# Patient Record
Sex: FEMALE | Race: WHITE | NOT HISPANIC OR LATINO | Employment: OTHER | URBAN - METROPOLITAN AREA
[De-identification: names, ages, dates, MRNs, and addresses within clinical notes are randomized per-mention and may not be internally consistent; named-entity substitution may affect disease eponyms.]

---

## 2017-01-19 ENCOUNTER — OFFICE VISIT - HEALTHEAST (OUTPATIENT)
Dept: FAMILY MEDICINE | Facility: CLINIC | Age: 37
End: 2017-01-19

## 2017-01-19 DIAGNOSIS — N92.6 MENSTRUAL IRREGULARITY: ICD-10-CM

## 2017-01-19 DIAGNOSIS — M21.962 KNEE DEFORMITY, ACQUIRED, LEFT: ICD-10-CM

## 2017-01-19 DIAGNOSIS — R06.83 SNORING: ICD-10-CM

## 2017-01-19 RX ORDER — IBUPROFEN 600 MG/1
600 TABLET, FILM COATED ORAL
Status: SHIPPED | COMMUNITY
Start: 2010-12-08

## 2017-02-16 ENCOUNTER — OFFICE VISIT - HEALTHEAST (OUTPATIENT)
Dept: OTOLARYNGOLOGY | Facility: CLINIC | Age: 37
End: 2017-02-16

## 2017-02-16 DIAGNOSIS — R06.83 SNORING: ICD-10-CM

## 2017-02-16 ASSESSMENT — MIFFLIN-ST. JEOR: SCORE: 1510.72

## 2018-06-15 ENCOUNTER — RECORDS - HEALTHEAST (OUTPATIENT)
Dept: ADMINISTRATIVE | Facility: OTHER | Age: 38
End: 2018-06-15

## 2018-06-21 ENCOUNTER — AMBULATORY - HEALTHEAST (OUTPATIENT)
Dept: FAMILY MEDICINE | Facility: CLINIC | Age: 38
End: 2018-06-21

## 2018-06-21 DIAGNOSIS — Z78.9 INFANT EXCLUSIVELY BREASTFED: ICD-10-CM

## 2018-06-25 ENCOUNTER — COMMUNICATION - HEALTHEAST (OUTPATIENT)
Dept: OBGYN | Facility: CLINIC | Age: 38
End: 2018-06-25

## 2018-07-12 ENCOUNTER — RECORDS - HEALTHEAST (OUTPATIENT)
Dept: ADMINISTRATIVE | Facility: OTHER | Age: 38
End: 2018-07-12

## 2018-07-20 ENCOUNTER — COMMUNICATION - HEALTHEAST (OUTPATIENT)
Dept: OBGYN | Facility: CLINIC | Age: 38
End: 2018-07-20

## 2018-10-18 ENCOUNTER — AMBULATORY - HEALTHEAST (OUTPATIENT)
Dept: NURSING | Facility: CLINIC | Age: 38
End: 2018-10-18

## 2018-10-19 ENCOUNTER — COMMUNICATION - HEALTHEAST (OUTPATIENT)
Dept: TELEHEALTH | Facility: CLINIC | Age: 38
End: 2018-10-19

## 2018-10-19 ENCOUNTER — COMMUNICATION - HEALTHEAST (OUTPATIENT)
Dept: HEALTH INFORMATION MANAGEMENT | Facility: CLINIC | Age: 38
End: 2018-10-19

## 2018-12-17 ENCOUNTER — OFFICE VISIT - HEALTHEAST (OUTPATIENT)
Dept: OTOLARYNGOLOGY | Facility: CLINIC | Age: 38
End: 2018-12-17

## 2018-12-17 DIAGNOSIS — R06.83 SNORING: ICD-10-CM

## 2018-12-17 RX ORDER — BREAST PUMP
EACH MISCELLANEOUS
Status: SHIPPED | COMMUNITY
Start: 2018-06-17

## 2018-12-17 RX ORDER — FERROUS SULFATE 325(65) MG
325 TABLET ORAL
Status: SHIPPED | COMMUNITY
Start: 2018-04-05

## 2019-01-17 ENCOUNTER — OFFICE VISIT - HEALTHEAST (OUTPATIENT)
Dept: SLEEP MEDICINE | Facility: CLINIC | Age: 39
End: 2019-01-17

## 2019-01-17 DIAGNOSIS — R06.83 SNORING: ICD-10-CM

## 2019-01-17 DIAGNOSIS — G47.10 EXCESSIVE SLEEPINESS: ICD-10-CM

## 2019-01-17 ASSESSMENT — MIFFLIN-ST. JEOR: SCORE: 1561.53

## 2019-01-29 ENCOUNTER — COMMUNICATION - HEALTHEAST (OUTPATIENT)
Dept: HEALTH INFORMATION MANAGEMENT | Facility: CLINIC | Age: 39
End: 2019-01-29

## 2019-01-29 ENCOUNTER — COMMUNICATION - HEALTHEAST (OUTPATIENT)
Dept: TELEHEALTH | Facility: CLINIC | Age: 39
End: 2019-01-29

## 2019-02-12 ENCOUNTER — COMMUNICATION - HEALTHEAST (OUTPATIENT)
Dept: SLEEP MEDICINE | Facility: CLINIC | Age: 39
End: 2019-02-12

## 2019-02-12 DIAGNOSIS — G47.10 EXCESSIVE SLEEPINESS: ICD-10-CM

## 2019-02-12 DIAGNOSIS — R06.83 SNORING: ICD-10-CM

## 2019-10-17 ENCOUNTER — AMBULATORY - HEALTHEAST (OUTPATIENT)
Dept: NURSING | Facility: CLINIC | Age: 39
End: 2019-10-17

## 2021-05-30 VITALS — BODY MASS INDEX: 30.49 KG/M2 | HEIGHT: 65 IN | WEIGHT: 183 LBS

## 2021-05-30 VITALS — BODY MASS INDEX: 30.2 KG/M2 | WEIGHT: 183.18 LBS

## 2021-06-02 VITALS — WEIGHT: 194.2 LBS | HEIGHT: 65 IN | BODY MASS INDEX: 32.36 KG/M2

## 2021-06-08 NOTE — PROGRESS NOTES
HPI: This patient is a 37yo F who presents for evaluation of snoring. The patient states that there has been snoring for quite some time, but that it has gotten to the point where her spouse is sleeping in a different room frequently. She did have a PSG done in the recent past that did not identify TRISHA, only primary snoring. She denies nasal congestion, daytime sleepiness, or witnessed apneas. She has not tried an oral appliance, and had no improvement with Breath-Right strips.    Past medical history, surgical history, social history, family history, medications, and allergies have been reviewed with the patient and are documented above.    Review of Systems: a 10-system review was performed. Pertinent positives are noted in the HPI and on a separate scanned document in the chart.    PHYSICAL EXAMINATION:  GEN: no acute distress, normocephalic. Normal body habitus  EYES: extraocular movements are intact, pupils are equal and round. Sclera clear.   EARS: auricles are normally formed. The external auditory canals are clear with minimal to no cerumen. Tympanic membranes are intact bilaterally with no signs of infection, effusion, retractions, or perforations.  NOSE: anterior nares are patent. There are no masses or lesions. The septum is non-obstructing. No turbinate hypertrophy  OC/OP: clear, dentition is in good repair. Jaw position normal, Gilliam 2, Tonsils 1.5, Soft palate not significantly elongated  NECK: soft and supple. No lymphadenopathy or masses. Airway is midline. Circumference <15in  NEURO: CN II-XII are intact bilaterally. alert and oriented. No nystagmus. Gait is normal.  PULM: breathing comfortably on room air, normal chest expansion with respiration  HEART: regular rate and rhythm, no peripheral edema    MEDICAL DECISION-MAKING: This patient is a 37yo F with primary snoring. Discussed oral appliances, Pillar procedure, and UPPP. Informed her of the risks and benefits of these and also informed her  that insurances do not cover procedure costs incurred for primary snoring. Also informed her that all procedures offered for snoring have an 80-85% success rate in decreasing snoring in a clinically significant way. She will think about her options and contact us if she wishes to pursue a procedure. Nasal issues are not causing her snoring, nor will treatments aimed at the nose make any significant improvement.

## 2021-06-08 NOTE — PROGRESS NOTES
"Assessment and Plan    1. Snoring  - Ambulatory referral to ENT    2. Menstrual irregularity  - Thyroid Stimulating Hormone (TSH)  - T4, Free    3. Knee deformity, acquired, left  There is no pain or dysfunction here so I discussed with her that I am not sure surgery would e covered. This would likely be considered plastic surgery, but if orthopedics does not do this, likely plastic surgery will want a \"pass\" from ortho first  - Ambulatory referral to Orthopedic Surgery     Shoulder symptoms seems benign - watch for now and we can consider PT in the future      Jaci Terry MD     -------------------------------------------    Chief Complaint   Patient presents with     Snoring     left knee issues since childhood, right shoulder pain irregular mestral        1)Ann Marie has been snoring - all her life - attributes sleeping on her side.  She did do a sleep study after daughter was born - no sleep apnea - that would have been in 2010.   says snoring worse lately - though she notes she  is recovering from cold.  She has tried breath right strips and a neti pot - this makes no difference    - -No seasonal allergies  - No nasal congestion, though left side is more open  - No runny nose  - No nose injury/ deviated septum  - Doesn't get strep throat    2) Iness right shoulder is sore   Pain occurs on and off.  She can in any way do everything but it is just sore.  Might be a little worse after sleeping on her side.  Not limiting.  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    3) Ann Marie is concerned about right knee deformity:- when she was 15 or 16 had a horse-riding accident - was riding an old horse on a trail and it tripped and fell ON HER.  She was in a knee immobilizer for a while.  Xrays done \"inconclusive if I actually broke something.\"  It has always been different after sullivan.: it look different, more sensitive and a little puffy.  Never hurts and doesn't give away. She does not wear clothing above her " "knees because she is conscious of the way it looks    4) She has irregular menses, and adds \"I would not be opposed to having more children, but my  is not there. \" She did not take birth control between having her two children and  stopped birth control for about a year.  Menses are still ridiculously long.  Current menses start on 1/10 - on day 7 it was that dark, end-of-menses color.  Then yesterday got bright red again    She has had no change in weight - same weight forever - tried but has been unable to loose weight; gets cold more easily than others, no constipation, no dry skin        Patient Active Problem List   Diagnosis   (none) - all problems resolved or deleted         Current Outpatient Prescriptions:      ibuprofen (ADVIL,MOTRIN) 600 MG tablet, Take 600 mg by mouth., Disp: , Rfl:      multivitamin therapeutic (THERAGRAN) tablet, Take 1 tablet by mouth daily., Disp: , Rfl:      etonogestrel-ethinyl estradiol (NUVARING) 0.12-0.015 mg/24 hr vaginal ring, Insert 1 each into the vagina every 21 days. Insert one (1) ring vaginally and leave in place for three (3) weeks, then remove for one (1) week., Disp: 3 each, Rfl: 0      There are no preventive care reminders to display for this patient.    History   Smoking Status     Former Smoker     Quit date: 5/20/2015   Smokeless Tobacco     Never Used     Comment: smoked some in college       History   Alcohol Use     1.0 - 1.5 oz/week     2 - 3 drink(s) per week       Vitals:    01/19/17 1454   BP: 110/64   Pulse: 72   Resp: 14     Body mass index is 30.2 kg/(m^2).     EXAM:    General appearance - alert, well appearing, and in no distress  Mouth - mucous membranes moist, pharynx normal without lesions and tonsils normal  Neck - supple, no significant adenopathy, thyroid exam: thyroid is normal in size without nodules or tenderness  Musculoskeletal - shoulder active ROM  Pain and clicking sensation behind in infraspinatus area with upper arm movement to " posterior

## 2021-06-18 NOTE — LETTER
Letter by Holly Tapia at      Author: Holly Tapia Service: -- Author Type: --    Filed:  Encounter Date: 1/29/2019 Status: (Other)       January 29, 2019       Ann Marie Schneider  [address] [place] [address]    Dear Ann Marie Schneider:    We are pleased to provide you with secure, online access to medical information for you and your family within [employer]. Per your request, we have expanded your account to allow access to the records of the following family members:              Cristiane Schneider (privilege ends on 6/3/2021.)            Guy Schneider (privilege ends on 11/22/2023.)            Lucretia Schneider (privilege ends on 6/14/2030.)     How Do I Log In?  1. In your Internet browser, go to [employer]  2. Log into [employer] using your [employer] Username and Password.  3. Click Sign In.        How Do I Access a Family Member's Account?  4. Select the account you want to access by clicking the [place] with the appropriate patient's name at the top of your screen.   5. You will see a disclaimer page letting you know that you will be viewing a family member's record. Review the disclaimer and then click Accept Proxy Access Disclaimer to proceed.  6. Once you switch to viewing a family member's record, you can navigate to [employer] pages the same way you would for yourself. You can return to your own account by clicking the [place] at the top of the screen with your name on it.    7. To customize the colors and names of the linked accounts, you can select Personalize from the Profile dropdown menu at the top of the screen, then click the Edit button to make changes.     Additional Information  If you have questions, visit [email or web address], e-mail [email or web address] or call [phone] to talk to our [employer] staff. Remember, [employer] is NOT to be used for urgent needs. For medical emergencies, dial 911.

## 2021-06-22 NOTE — PROGRESS NOTES
HISTORY OF PRESENT ILLNESS  Patient reports that she snores loudly. She had a sleep study about 9 years ago and it said that she didn't have sleep apnea. She has snored all of her life. No difficulty breathing through her nose. She does experience drainage. She has seen Dr. Conway in the past and comes in for a second opinion. I reviewed Dr. Conway's encounter. She is unaware is she has developed apnea and is concerned about her sleep hygiene.    REVIEW OF SYSTEMS  Review of Systems: a 10-system review was performed. Pertinent positives are noted in the HPI and on a separate scanned document in the chart.    PMH, PSH, FH and SH has documented in the EHR.      EXAM    CONSTITUTIONAL  General Appearance:  Normal, well developed, well nourished, no obvious distress  Ability to Communicate:  communicates appropriately.    HEAD AND FACE  Appearance and Symmetry:  Normal, no scalp or facial scarring or suspicious lesions.  Paranasal sinuses tenderness:  Normal, Paranasal sinuses non tender    EARS  Clinical speech reception threshold:  Normal, able to hear normal speech.  Auricle:  Normal, Auricles without scars, lesions, masses.  External auditory canal:  Normal, External auditory canal normal.  Tympanic membrane:  Normal, Tympanic membranes normal without swelling or erythema.  Tympanic membrane mobility:  Normal, Normal tympanic membrane mobility.    NOSE (speculum or scope)  Architecture:  Normal, Grossly normal external nasal architecture with no masses or lesions.  Mucosa:  Normal mucosa, No polyps or masses.  Septum:  Normal, Septum non-obstructing.  Turbinates:  Normal, No turbinate abnormalities    ORAL CAVITY AND OROPHARYNX  Lips:  Normal.  Dental and gingiva:  Normal, No obvious dental or gingival disease.  Mucosa:  Normal, Moist mucous membranes.  Tongue:  Normal, Tongue mobile with no mucosal abnormalities  Hard and soft palate:  Normal, Hard and soft palate without cleft or mucosal lesions.  Oral pharynx:   Normal, Posterior pharynx without lesions or remarkable asymmetry.  Saliva:  Normal, Clear saliva.  Masses:  Normal, No palpable masses or pathologically enlarged lymph nodes.    NECK  Masses/lymph nodes:  Normal, No worrisome neck masses or lymph nodes.  Salivary glands:  Normal, Parotid and submandibular glands.  Trachea and larynx position:  Normal, Trachea and larynx midline.  Thyroid:  Normal, No thyroid abnormality.  Tenderness:  Normal, No cervical tenderness.  Suppleness:  Normal, Neck supple    NEUROLOGICAL  Speech pattern:  Normal, Proasaic    RESPIRATORY  Symmetry and Respiratory effort:  Normal, Symmetric chest movement and expansion with no increased intercostal retractions or use of accessory muscles.     IMPRESSION  Primary snoring. Discussed findings which are within normal limits. No evidence of significant airway obstruction or unfavorable anatomy.    RECOMMENDATION  I discussed treatment for primary snoring including oral appliances LAUP, Somnoplasty, UP3 and Pillar implants. I discussed the treatments are not covered by insurance. She expressed understanding and will follow up as needed. It has been 9 years or so since her prior sleep study. She is interested in considering options and wants to make sure that she doesn't have sleep apnea.     Han Tovar MD

## 2021-06-23 NOTE — PROGRESS NOTES
Dear  Han Tovar Md  [address]  [address]  [place] [address]    Thank you for the opportunity to participate in the care of  Ann Marie Schneider.    Ann Marie Schneider is sent by Han Tovar MD for a sleep consultation regarding snoring and possible sleep apnea.     History of loud snoring.  Had sleep study 9 years ago which is normal.  Saw ENT but was told no guarantee at success.  Dentist told her she'd have to move her jaw really far forward.      She has no past medical history.    Schedule - Alarm goes off at 6:45 and 7 AM and she gets up around 7 AM.  Getting into bed around 11 PM and asleep within 10 - 15 minutes.  Baby up 2 - 5 times per night.      Sleep Disordered Breathing - Snoring is loud but not audible outside the bedroom.  No snort arousals.  No witnessed apneas.  Snoring is worst in supine position.     Only rare nocturia.  No nocturnal GERD.   Upon waking feels tired.  She denies morning headaches.  No morning dry mouth.  Does get morning sinus congestion.   Patient was counseled on the importance of driving while alert, to pull over if drowsy, or nap before getting into the vehicle if sleepy.    Movement/Behaviors - No somniloquy.  No somnambulism.  No sleep related eating.  No dream enactment behavior.   She denies bruxism.    Patient denies typical restless legs syndrome symptoms (did have some with pregnancy).    Alcohol use - 2 nights per week will have 1 - 2 glasses.  Caffeine intake - caffeinated soft drinks 1 - 2 /day. Last caffeine intake is usually 4 PM.  Tobacco exposure - none  Illicit substances - none    Lives with  and 3 kids (9d, 7s and 7d months ).  Has no pets.     Does have family history of snoring in son and both parents.    STOP BANG 1/17/2019   Do you snore loudly (louder than talking or loud enough to be heard through closed doors)? 1   Do you often feel tired, fatigued, or sleepy during daytime? 1   Has anyone observed you stop breathing in your sleep? 0    Do you have or are you being treated for high blood pressure? 0   BMI more than 35 kg/m2 0   Age over 50 years old? 0   Neck circumference greater than 16 inches? 0   Gender male? 0   Total Score 2     Past Medical History  Past Medical History:   Diagnosis Date     No disease found       Past Surgical History  Past Surgical History:   Procedure Laterality Date     TX DILATION/CURETTAGE,DIAGNOSTIC      Description: Dilation And Curettage;  Recorded: 05/09/2013;        Meds  Current Outpatient Medications   Medication Sig Dispense Refill     breast pump Rama For home use. Gestation age at delivery: 40w1d. Reason for need: lactation. Length of need: 1 year       cholecalciferol, vitamin D3, 2,000 unit capsule Take 2,000 Units by mouth.       ferrous sulfate 325 (65 FE) MG tablet Take 325 mg by mouth.       multivitamin therapeutic (THERAGRAN) tablet Take 1 tablet by mouth daily.       OMEGA-3 FATTY ACIDS ORAL Take by mouth.       etonogestrel-ethinyl estradiol (NUVARING) 0.12-0.015 mg/24 hr vaginal ring Insert 1 each into the vagina every 21 days. Insert one (1) ring vaginally and leave in place for three (3) weeks, then remove for one (1) week. 3 each 0     ibuprofen (ADVIL,MOTRIN) 600 MG tablet Take 600 mg by mouth.       No current facility-administered medications for this visit.         Allergies  Penicillins and Sulfa (sulfonamide antibiotics)     Social History  Social History     Socioeconomic History     Marital status:      Spouse name: Not on file     Number of children: Not on file     Years of education: Not on file     Highest education level: Not on file   Social Needs     Financial resource strain: Not on file     Food insecurity - worry: Not on file     Food insecurity - inability: Not on file     Transportation needs - medical: Not on file     Transportation needs - non-medical: Not on file   Occupational History     Not on file   Tobacco Use     Smoking status: Former Smoker     Last  "attempt to quit: 5/20/2015     Years since quitting: 3.6     Smokeless tobacco: Never Used     Tobacco comment: smoked some in college   Substance and Sexual Activity     Alcohol use: Yes     Alcohol/week: 1.0 - 1.5 oz     Types: 2 - 3 Standard drinks or equivalent per week     Drug use: No     Sexual activity: Yes     Partners: Male     Birth control/protection: Other   Other Topics Concern     Not on file   Social History Narrative     Not on file        Family History  Family History   Problem Relation Age of Onset     No Medical Problems Mother      Hyperlipidemia Father      No Medical Problems Brother      No Medical Problems Maternal Aunt      Cancer Maternal Uncle 50        colon?     No Medical Problems Paternal Aunt      No Medical Problems Paternal Uncle      Breast cancer Maternal Grandmother 60     Heart disease Maternal Grandmother 80     Diabetes Maternal Grandmother         insulin     No Medical Problems Brother      Review of Systems: Weight changes; heat or cold intolerance.  Constitutional: Negative except as noted in HPI.   Eyes: Negative except as noted in HPI.   ENT: Negative except as noted in HPI.   Cardiovascular: Negative except as noted in HPI.   Respiratory: Negative except as noted in HPI.   Gastrointestinal: Negative except as noted in HPI.   Genitourinary: Negative except as noted in HPI.   Musculoskeletal: Negative except as noted in HPI.   Integumentary: Negative except as noted in HPI.   Neurological: Negative except as noted in HPI.   Psychiatric: Negative except as noted in HPI.   Endocrine: Negative except as noted in HPI.   Hematologic/Lymphatic: Negative except as noted in HPI.      Physical Exam:  /70   Pulse 63   Ht 5' 5.3\" (1.659 m)   Wt 194 lb 3.2 oz (88.1 kg)   SpO2 98%   BMI 32.02 kg/m    Body mass index is 32.02 kg/m .   General appearance: No apparent distress, well groomed.    HEENT:   Head: Normocephalic, atraumatic.  Eyes: PERRL    Musculoskeletal: No " edema noted.  No clubbing or cyanosis.  Skin: Warm, dry, intact.  Neurologic: Alert and oriented to person, place and time   Gait is normal.  Psychiatric: Affect pleasant.  Mood goo.     Labs/Studies:     Lab Results   Component Value Date    WBC 8.1 04/14/2011    HGB 12.5 01/13/2012    HCT 35.4 04/14/2011    MCV 87 04/14/2011     04/14/2011         Chemistry    No results found for: NA, K, CL, CO2, BUN, CREATININE, GLU     Component Value Date/Time    ALKPHOS 54 04/14/2011 0926    AST 18 04/14/2011 0926    ALT 31 04/14/2011 0926    BILITOT 0.3 04/14/2011 0926            No results found for: FERRITIN  Lab Results   Component Value Date    TSH 1.10 01/19/2017     No results found for: HGBA1C      Assessment and Plan:  1. Snoring  2. Excessive sleepiness  I have a suspicion for TRISHA based on presence of snoring and ESS. We discussed pathophysiology of obstructive sleep apnea, testing with overnight polysomnography, and treatment modalities (CPAP only discussed at this visit). We discussed consequences of untreated TRISHA. Patient is interested in treatment and willing to undergo overnight sleep testing.  Home sleep testing is inappropriate for this patient due to lack of high pretest probability for disease.  Study has been ordered today.    - Split Night Sleep Study; Future    Patient verbalized understanding of these issues, agrees with the plan and all questions were answered today. Patient was given an opportuntity to voice any other symptoms or concerns not listed above. Patient did not have any other symptoms or concerns.      Jean Claude Landry MD  [facility] Board Certified in Internal Medicine and Sleep Medicine  [facility].

## 2021-06-23 NOTE — PATIENT INSTRUCTIONS - HE
Zzoma positional device (Zzoma). $200  Night shift sleep apnea positional device (ABM).  $350  Both work and have been studied, but both require prescriptions.      Options for snoring include:  Mouthguard type devices  - OTC SnoreRx or other snore guards typically runs around 50 - 100 dollars.  Similar devices on market. They don't often work that great.  - Professionally made snoring device typically more expensive but may run 1200 - 1800 dollar range...    EPAP device:  - Provent FDA approved 80 dollars/month  - Theravent one-off OTC device 1 - 2 dollars per day    Breath-Right strips can sometimes make a difference.    Surgery for snoring   - Cost is typically more than either above; I would refer you to an ear-nose-throat surgeon if you were considering this.      There is a snoring program/michael that the [employer] [facility] [employer] is testing.  You can sign up for the Beta testing if you are interested.  It is called Soundly - http://sleepsound.ly/    CPAP but usually only covered for Obstructive Sleep Apnea and costs 3000 dollars through insurance (you can probably get for under 1000 dollars with a reliable online vendor).      Finally, exercise and weight loss can be beneficial in improving snoring.

## 2021-06-24 NOTE — TELEPHONE ENCOUNTER
"    I talked to Ann Marie and she said that she doesn't think she needs the sleep study all together. She states that she \"just snores and that there is no treatment to snoring\". She canceled her ss for tomorrow (psg on 2/13) and then she canceled her f/u. She states that she doesn't want to pursue the HST.   "

## 2021-06-24 NOTE — TELEPHONE ENCOUNTER
[employer] won't cover Polysomnography except if she has high risk condition.  Their policy is not in line with current standards of care but they will not cover Polysomnography in Ann Marie's case.  I placed Home Sleep Apnea Testing order and we'll need to start there.  Please cancel Polysomnography and schedule Home Sleep Apnea Testing and f/u (or keep f/u as scheduled if we can get Home Sleep Apnea Testing done in time).    Per Janelle in financial clearing no prior auth is required for Home Sleep Apnea Testing so we can move ahead ASAP.  Kristian Silverio

## 2021-08-21 ENCOUNTER — HEALTH MAINTENANCE LETTER (OUTPATIENT)
Age: 41
End: 2021-08-21

## 2021-10-16 ENCOUNTER — HEALTH MAINTENANCE LETTER (OUTPATIENT)
Age: 41
End: 2021-10-16

## 2021-11-29 ENCOUNTER — IMMUNIZATION (OUTPATIENT)
Dept: FAMILY MEDICINE | Facility: CLINIC | Age: 41
End: 2021-11-29
Payer: COMMERCIAL

## 2021-11-29 PROCEDURE — 90686 IIV4 VACC NO PRSV 0.5 ML IM: CPT

## 2021-11-29 PROCEDURE — 90471 IMMUNIZATION ADMIN: CPT

## 2021-12-10 ENCOUNTER — TRANSFERRED RECORDS (OUTPATIENT)
Dept: HEALTH INFORMATION MANAGEMENT | Facility: CLINIC | Age: 41
End: 2021-12-10
Payer: COMMERCIAL

## 2021-12-23 ENCOUNTER — TRANSFERRED RECORDS (OUTPATIENT)
Dept: HEALTH INFORMATION MANAGEMENT | Facility: CLINIC | Age: 41
End: 2021-12-23
Payer: COMMERCIAL

## 2021-12-28 ENCOUNTER — TRANSFERRED RECORDS (OUTPATIENT)
Dept: HEALTH INFORMATION MANAGEMENT | Facility: CLINIC | Age: 41
End: 2021-12-28
Payer: COMMERCIAL

## 2022-01-31 ENCOUNTER — TRANSFERRED RECORDS (OUTPATIENT)
Dept: HEALTH INFORMATION MANAGEMENT | Facility: CLINIC | Age: 42
End: 2022-01-31
Payer: COMMERCIAL

## 2022-03-14 ENCOUNTER — TRANSFERRED RECORDS (OUTPATIENT)
Dept: HEALTH INFORMATION MANAGEMENT | Facility: CLINIC | Age: 42
End: 2022-03-14
Payer: COMMERCIAL

## 2022-04-21 ENCOUNTER — TRANSFERRED RECORDS (OUTPATIENT)
Dept: HEALTH INFORMATION MANAGEMENT | Facility: CLINIC | Age: 42
End: 2022-04-21
Payer: COMMERCIAL

## 2022-05-04 ENCOUNTER — TRANSFERRED RECORDS (OUTPATIENT)
Dept: HEALTH INFORMATION MANAGEMENT | Facility: CLINIC | Age: 42
End: 2022-05-04
Payer: COMMERCIAL

## 2022-09-25 ENCOUNTER — HEALTH MAINTENANCE LETTER (OUTPATIENT)
Age: 42
End: 2022-09-25

## 2023-10-14 ENCOUNTER — HEALTH MAINTENANCE LETTER (OUTPATIENT)
Age: 43
End: 2023-10-14

## 2024-03-02 ENCOUNTER — HEALTH MAINTENANCE LETTER (OUTPATIENT)
Age: 44
End: 2024-03-02

## 2024-12-07 ENCOUNTER — HEALTH MAINTENANCE LETTER (OUTPATIENT)
Age: 44
End: 2024-12-07